# Patient Record
Sex: FEMALE | Race: WHITE | NOT HISPANIC OR LATINO
[De-identification: names, ages, dates, MRNs, and addresses within clinical notes are randomized per-mention and may not be internally consistent; named-entity substitution may affect disease eponyms.]

---

## 2022-01-31 ENCOUNTER — APPOINTMENT (OUTPATIENT)
Dept: ORTHOPEDIC SURGERY | Facility: CLINIC | Age: 72
End: 2022-01-31

## 2022-02-04 ENCOUNTER — INPATIENT (INPATIENT)
Facility: HOSPITAL | Age: 72
LOS: 0 days | Discharge: HOME | DRG: 534 | End: 2022-02-05
Attending: INTERNAL MEDICINE | Admitting: ORTHOPAEDIC SURGERY
Payer: COMMERCIAL

## 2022-02-04 ENCOUNTER — APPOINTMENT (OUTPATIENT)
Dept: ORTHOPEDIC SURGERY | Facility: CLINIC | Age: 72
End: 2022-02-04

## 2022-02-04 VITALS
OXYGEN SATURATION: 97 % | HEART RATE: 77 BPM | SYSTOLIC BLOOD PRESSURE: 116 MMHG | RESPIRATION RATE: 18 BRPM | TEMPERATURE: 98 F | WEIGHT: 139.99 LBS | HEIGHT: 62 IN | DIASTOLIC BLOOD PRESSURE: 80 MMHG

## 2022-02-04 DIAGNOSIS — F41.9 ANXIETY DISORDER, UNSPECIFIED: ICD-10-CM

## 2022-02-04 DIAGNOSIS — S72.92XA UNSPECIFIED FRACTURE OF LEFT FEMUR, INITIAL ENCOUNTER FOR CLOSED FRACTURE: ICD-10-CM

## 2022-02-04 DIAGNOSIS — Z98.84 BARIATRIC SURGERY STATUS: Chronic | ICD-10-CM

## 2022-02-04 DIAGNOSIS — R09.89 OTHER SPECIFIED SYMPTOMS AND SIGNS INVOLVING THE CIRCULATORY AND RESPIRATORY SYSTEMS: ICD-10-CM

## 2022-02-04 DIAGNOSIS — Z98.890 OTHER SPECIFIED POSTPROCEDURAL STATES: Chronic | ICD-10-CM

## 2022-02-04 DIAGNOSIS — K21.9 GASTRO-ESOPHAGEAL REFLUX DISEASE WITHOUT ESOPHAGITIS: ICD-10-CM

## 2022-02-04 DIAGNOSIS — Z29.9 ENCOUNTER FOR PROPHYLACTIC MEASURES, UNSPECIFIED: ICD-10-CM

## 2022-02-04 DIAGNOSIS — S72.90XA UNSPECIFIED FRACTURE OF UNSPECIFIED FEMUR, INITIAL ENCOUNTER FOR CLOSED FRACTURE: ICD-10-CM

## 2022-02-04 LAB
ALBUMIN SERPL ELPH-MCNC: 3.9 G/DL — SIGNIFICANT CHANGE UP (ref 3.3–5)
ALP SERPL-CCNC: 71 U/L — SIGNIFICANT CHANGE UP (ref 40–120)
ALT FLD-CCNC: 9 U/L — LOW (ref 10–45)
ANION GAP SERPL CALC-SCNC: 15 MMOL/L — SIGNIFICANT CHANGE UP (ref 5–17)
APTT BLD: 25.6 SEC — LOW (ref 27.5–35.5)
AST SERPL-CCNC: 13 U/L — SIGNIFICANT CHANGE UP (ref 10–40)
BASOPHILS # BLD AUTO: 0.02 K/UL — SIGNIFICANT CHANGE UP (ref 0–0.2)
BASOPHILS NFR BLD AUTO: 0.3 % — SIGNIFICANT CHANGE UP (ref 0–2)
BILIRUB SERPL-MCNC: 0.6 MG/DL — SIGNIFICANT CHANGE UP (ref 0.2–1.2)
BLD GP AB SCN SERPL QL: NEGATIVE — SIGNIFICANT CHANGE UP
BUN SERPL-MCNC: 18 MG/DL — SIGNIFICANT CHANGE UP (ref 7–23)
CALCIUM SERPL-MCNC: 9.5 MG/DL — SIGNIFICANT CHANGE UP (ref 8.4–10.5)
CHLORIDE SERPL-SCNC: 99 MMOL/L — SIGNIFICANT CHANGE UP (ref 96–108)
CO2 SERPL-SCNC: 23 MMOL/L — SIGNIFICANT CHANGE UP (ref 22–31)
CREAT SERPL-MCNC: 0.58 MG/DL — SIGNIFICANT CHANGE UP (ref 0.5–1.3)
EOSINOPHIL # BLD AUTO: 0.12 K/UL — SIGNIFICANT CHANGE UP (ref 0–0.5)
EOSINOPHIL NFR BLD AUTO: 1.8 % — SIGNIFICANT CHANGE UP (ref 0–6)
GLUCOSE SERPL-MCNC: 96 MG/DL — SIGNIFICANT CHANGE UP (ref 70–99)
HCT VFR BLD CALC: 33.4 % — LOW (ref 34.5–45)
HGB BLD-MCNC: 10.1 G/DL — LOW (ref 11.5–15.5)
IMM GRANULOCYTES NFR BLD AUTO: 0.3 % — SIGNIFICANT CHANGE UP (ref 0–1.5)
INR BLD: 0.97 RATIO — SIGNIFICANT CHANGE UP (ref 0.88–1.16)
LYMPHOCYTES # BLD AUTO: 2.13 K/UL — SIGNIFICANT CHANGE UP (ref 1–3.3)
LYMPHOCYTES # BLD AUTO: 32.5 % — SIGNIFICANT CHANGE UP (ref 13–44)
MCHC RBC-ENTMCNC: 25.3 PG — LOW (ref 27–34)
MCHC RBC-ENTMCNC: 30.2 GM/DL — LOW (ref 32–36)
MCV RBC AUTO: 83.5 FL — SIGNIFICANT CHANGE UP (ref 80–100)
MONOCYTES # BLD AUTO: 0.57 K/UL — SIGNIFICANT CHANGE UP (ref 0–0.9)
MONOCYTES NFR BLD AUTO: 8.7 % — SIGNIFICANT CHANGE UP (ref 2–14)
NEUTROPHILS # BLD AUTO: 3.69 K/UL — SIGNIFICANT CHANGE UP (ref 1.8–7.4)
NEUTROPHILS NFR BLD AUTO: 56.4 % — SIGNIFICANT CHANGE UP (ref 43–77)
NRBC # BLD: 0 /100 WBCS — SIGNIFICANT CHANGE UP (ref 0–0)
PLATELET # BLD AUTO: 200 K/UL — SIGNIFICANT CHANGE UP (ref 150–400)
POTASSIUM SERPL-MCNC: 4.3 MMOL/L — SIGNIFICANT CHANGE UP (ref 3.5–5.3)
POTASSIUM SERPL-SCNC: 4.3 MMOL/L — SIGNIFICANT CHANGE UP (ref 3.5–5.3)
PROT SERPL-MCNC: 7.2 G/DL — SIGNIFICANT CHANGE UP (ref 6–8.3)
PROTHROM AB SERPL-ACNC: 11.7 SEC — SIGNIFICANT CHANGE UP (ref 10.6–13.6)
RBC # BLD: 4 M/UL — SIGNIFICANT CHANGE UP (ref 3.8–5.2)
RBC # FLD: 15.3 % — HIGH (ref 10.3–14.5)
RH IG SCN BLD-IMP: POSITIVE — SIGNIFICANT CHANGE UP
SARS-COV-2 RNA SPEC QL NAA+PROBE: SIGNIFICANT CHANGE UP
SODIUM SERPL-SCNC: 137 MMOL/L — SIGNIFICANT CHANGE UP (ref 135–145)
WBC # BLD: 6.55 K/UL — SIGNIFICANT CHANGE UP (ref 3.8–10.5)
WBC # FLD AUTO: 6.55 K/UL — SIGNIFICANT CHANGE UP (ref 3.8–10.5)

## 2022-02-04 PROCEDURE — 73610 X-RAY EXAM OF ANKLE: CPT | Mod: 26,RT

## 2022-02-04 PROCEDURE — 73552 X-RAY EXAM OF FEMUR 2/>: CPT | Mod: 26,LT

## 2022-02-04 PROCEDURE — 73700 CT LOWER EXTREMITY W/O DYE: CPT | Mod: 26,LT,MA

## 2022-02-04 PROCEDURE — 99223 1ST HOSP IP/OBS HIGH 75: CPT

## 2022-02-04 PROCEDURE — 99285 EMERGENCY DEPT VISIT HI MDM: CPT | Mod: GC

## 2022-02-04 PROCEDURE — 73564 X-RAY EXAM KNEE 4 OR MORE: CPT | Mod: 26,50

## 2022-02-04 PROCEDURE — 73590 X-RAY EXAM OF LOWER LEG: CPT | Mod: 26,LT

## 2022-02-04 PROCEDURE — 76377 3D RENDER W/INTRP POSTPROCES: CPT | Mod: 26

## 2022-02-04 PROCEDURE — 93010 ELECTROCARDIOGRAM REPORT: CPT

## 2022-02-04 PROCEDURE — 71045 X-RAY EXAM CHEST 1 VIEW: CPT | Mod: 26

## 2022-02-04 RX ORDER — ACETAMINOPHEN 500 MG
975 TABLET ORAL ONCE
Refills: 0 | Status: COMPLETED | OUTPATIENT
Start: 2022-02-04 | End: 2022-02-04

## 2022-02-04 RX ORDER — ESCITALOPRAM OXALATE 10 MG/1
20 TABLET, FILM COATED ORAL DAILY
Refills: 0 | Status: DISCONTINUED | OUTPATIENT
Start: 2022-02-04 | End: 2022-02-05

## 2022-02-04 RX ORDER — ZOLPIDEM TARTRATE 10 MG/1
1 TABLET ORAL
Qty: 0 | Refills: 0 | DISCHARGE

## 2022-02-04 RX ORDER — ACETAMINOPHEN 500 MG
650 TABLET ORAL EVERY 6 HOURS
Refills: 0 | Status: DISCONTINUED | OUTPATIENT
Start: 2022-02-04 | End: 2022-02-05

## 2022-02-04 RX ORDER — LANOLIN ALCOHOL/MO/W.PET/CERES
3 CREAM (GRAM) TOPICAL AT BEDTIME
Refills: 0 | Status: DISCONTINUED | OUTPATIENT
Start: 2022-02-04 | End: 2022-02-05

## 2022-02-04 RX ORDER — IBUPROFEN 200 MG
400 TABLET ORAL ONCE
Refills: 0 | Status: COMPLETED | OUTPATIENT
Start: 2022-02-04 | End: 2022-02-04

## 2022-02-04 RX ORDER — LIDOCAINE 4 G/100G
1 CREAM TOPICAL DAILY
Refills: 0 | Status: DISCONTINUED | OUTPATIENT
Start: 2022-02-04 | End: 2022-02-05

## 2022-02-04 RX ORDER — OXYCODONE HYDROCHLORIDE 5 MG/1
5 TABLET ORAL EVERY 4 HOURS
Refills: 0 | Status: DISCONTINUED | OUTPATIENT
Start: 2022-02-04 | End: 2022-02-05

## 2022-02-04 RX ORDER — ONDANSETRON 8 MG/1
4 TABLET, FILM COATED ORAL EVERY 8 HOURS
Refills: 0 | Status: DISCONTINUED | OUTPATIENT
Start: 2022-02-04 | End: 2022-02-05

## 2022-02-04 RX ORDER — ZOLPIDEM TARTRATE 10 MG/1
5 TABLET ORAL AT BEDTIME
Refills: 0 | Status: DISCONTINUED | OUTPATIENT
Start: 2022-02-04 | End: 2022-02-05

## 2022-02-04 RX ORDER — LANSOPRAZOLE 15 MG/1
1 CAPSULE, DELAYED RELEASE ORAL
Qty: 0 | Refills: 0 | DISCHARGE

## 2022-02-04 RX ORDER — ASPIRIN/CALCIUM CARB/MAGNESIUM 324 MG
81 TABLET ORAL
Refills: 0 | Status: DISCONTINUED | OUTPATIENT
Start: 2022-02-04 | End: 2022-02-05

## 2022-02-04 RX ORDER — PANTOPRAZOLE SODIUM 20 MG/1
40 TABLET, DELAYED RELEASE ORAL
Refills: 0 | Status: DISCONTINUED | OUTPATIENT
Start: 2022-02-04 | End: 2022-02-05

## 2022-02-04 RX ORDER — POLYETHYLENE GLYCOL 3350 17 G/17G
17 POWDER, FOR SOLUTION ORAL DAILY
Refills: 0 | Status: DISCONTINUED | OUTPATIENT
Start: 2022-02-04 | End: 2022-02-05

## 2022-02-04 RX ORDER — LIDOCAINE 4 G/100G
1 CREAM TOPICAL ONCE
Refills: 0 | Status: COMPLETED | OUTPATIENT
Start: 2022-02-04 | End: 2022-02-04

## 2022-02-04 RX ADMIN — LIDOCAINE 1 PATCH: 4 CREAM TOPICAL at 13:01

## 2022-02-04 RX ADMIN — OXYCODONE HYDROCHLORIDE 5 MILLIGRAM(S): 5 TABLET ORAL at 23:20

## 2022-02-04 RX ADMIN — LIDOCAINE 1 PATCH: 4 CREAM TOPICAL at 22:00

## 2022-02-04 RX ADMIN — Medication 975 MILLIGRAM(S): at 13:00

## 2022-02-04 NOTE — ED PROVIDER NOTE - NS ED ROS FT
CONST: no fevers, no chills  ENT: no sore throat  CV: +chest pain, no leg swelling  RESP: no shortness of breath, no cough  ABD: no abdominal pain, no nausea, no vomiting, no diarrhea  : no dysuria, no flank pain, no hematuria  MSK: no back pain, +extremity pain  NEURO: no headache or additional neurologic complaints  SKIN:  no rash

## 2022-02-04 NOTE — H&P ADULT - PROBLEM SELECTOR PLAN 2
ISTOP reviewed Reference #: [id number]  -Cont. ambien 5mg QHS PRN insomnia  -Falls precautions  -Cont. lexapro daily

## 2022-02-04 NOTE — H&P ADULT - HISTORY OF PRESENT ILLNESS
72F w/ hx of gastric bypass p/w fall from bed 5 days ago. Pt had mechanical fall out of bed 5 days ago. Had severe L leg pain after fall and was unable to bear weight. Has been in bed for past several days due to pain. Has been taking 's oxycodone to tolerate pain with movement. No significant pain when not moving. Pt called PMD today who suggested she go to hospital for further evaluation. Pt otherwise denies any dizziness, chest pain, palpitations or syncopal episodes.    In ER: Given tylenol 975mg PO, ibuprofen 400mg, lidocaine patch

## 2022-02-04 NOTE — ED PROVIDER NOTE - OBJECTIVE STATEMENT
72F no significant PMH presenting after mechanical fall from bed 5 days ago. No head trauma, LOC, A/C use. Complaining of R chest pain and L knee pain since then. Unable to bear weight or walk. Been taking oxycodone and tylenol at home, last use this morning 7AM. No fevers/chils, vision changes, n/v, abdominal pain, SOB, numbness/tingling

## 2022-02-04 NOTE — ED PROVIDER NOTE - PROGRESS NOTE DETAILS
Atilio REYES (PGY-2)  per ortho team, pt will not be operated on but will be admitted to dr. redd's service The patient wishes to be discharged against medical advice. I have assessed the patient's mental status and  the patient has capacity to make this decision. I have explained the risks of leaving without full treatment, including fall causing severe disability, which the patient understands and is willing to accept. I have answered all of the patient's questions. I reiterated my medical opinion and advised the patient to return at any time. We discussed the further workup outside of the current visit and return precautions. Patient states she has walkers and wheelchair and will get about her home that way. Ortho will see her Wednesday to make brace. Needs to be nwb/toe touch. -Ruperto Bui MD- now agrees to stay as cannot use crutches - discussed the case with the admitting MD

## 2022-02-04 NOTE — ED PROVIDER NOTE - CLINICAL SUMMARY MEDICAL DECISION MAKING FREE TEXT BOX
72F p/w fall after 5 days with chest pain and L LE pain. VSS in ED. ECG w/o abnormal changes. NCAT head, lungs ctab, spine Nontender, abd nontender, B/L LE tenderness, R cheset wall tenderness  Will eval for LE injury with xrays and chest wall injury on CXR. Low concern for myocardial contusion given no active chest pain and no ECG changes

## 2022-02-04 NOTE — CONSULT NOTE ADULT - SUBJECTIVE AND OBJECTIVE BOX
Patient is a 72yFemale community ambulator who presents to ED w/ a c/o of left knee pain for the past 5 days. Patient states she fell out of bed 5 days ago landing on her left side, she felt immediate pain in the left knee and has had pain with weight bearing on the left knee since. Denies HH/LOC. Denies any numbness or tingling. Denies having any other pain elsewhere. No other orthopedic concerns at this time.    No pertinent past medical history            adhesives (Urticaria)  No Known Drug Allergies      PHYSICAL EXAM:  T(C): 36.6 (02-04-22 @ 15:59), Max: 36.7 (02-04-22 @ 11:26)  HR: 74 (02-04-22 @ 15:59) (74 - 77)  BP: 114/77 (02-04-22 @ 15:59) (114/77 - 133/82)  RR: 18 (02-04-22 @ 15:59) (18 - 18)  SpO2: 97% (02-04-22 @ 15:59) (97% - 99%)    Gen: NAD, Resting comfortably    LLE:  Skin intact, no erythema or ecchymosis  pos bony tenderness to palpation over lateral left knee  pain with ROM of left knee  +EHL/FHL/TA/GSC  +SILT L3-S1  + DP  Compartments soft and compressible  No calf tenderness    Secondary Exam: Benign, Skin intact, NTTP along axial spine, SILT throughout, motor grossly intact throughout, no other orthopedic injuries at this time, compartments soft and compressible    XR and CT show nondisplaced distal femoral lateral condyle fx.    A/P: 72F w/ left nondisplaced distal femoral lateral condyle fx    placed in knee immobilizer to be NWB on LLE  Analgesia  DVT ppx rec xarelto 10mg once per day or A81 twice per day  PT/OT  Ice and elevate as tolerated  No acute orthopedic surgical intervention indicated at this time  Orthopedically stable  Discussed with attending who is in agreement with above plan  plan to FU w Dr Burden in office next Wednesday will transition to a samson brace at that time  pt aware and agrees w plan, all questions answered

## 2022-02-04 NOTE — H&P ADULT - NSHPPHYSICALEXAM_GEN_ALL_CORE
Vital Signs Last 24 Hrs  T(C): 37 (02-04-22 @ 21:28), Max: 37 (02-04-22 @ 21:28)  T(F): 98.6 (02-04-22 @ 21:28), Max: 98.6 (02-04-22 @ 21:28)  HR: 76 (02-04-22 @ 21:28) (74 - 77)  BP: 119/77 (02-04-22 @ 21:28) (114/77 - 133/82)  BP(mean): --  RR: 18 (02-04-22 @ 21:28) (18 - 18)  SpO2: 98% (02-04-22 @ 21:28) (97% - 99%)

## 2022-02-04 NOTE — ED ADULT NURSE REASSESSMENT NOTE - NS ED NURSE REASSESS COMMENT FT1
Pt sitting up in stretcher on phone, no acute distress. Pt updated on plan of care, awaiting CT. No further RN interventions at this time.

## 2022-02-04 NOTE — ED ADULT NURSE NOTE - OBJECTIVE STATEMENT
Pt is 71 y/o female who presents to the ED by EMS c/o fall. Pt states fell x5 days and has L knee pain and R chest pain. Pt PMH of GERD and osteoporosis, compliant w/ daily medications and denies AC use. Upon assessment, pt axo x3, sitting up in bed, and speaking in full sentences. Pt is breathing spontaneously and unlabored, abdomen is soft and non-tender to palpation. Pt endorses L knee pain w/ ROM and R upper chest wall pain on palpation. Skin is warm, dry, and intact w/ + peripheral pulses. Pt denies LOC, dizziness, and n/v/d. Pt is 71 y/o female who presents to the ED by EMS c/o fall. Pt states fell x5 days and has L knee pain and R chest pain. Pt PMH of GERD and osteoporosis, compliant w/ daily medications and denies AC use. Upon assessment, pt axo x3, sitting up in bed, and speaking in full sentences. Pt is breathing spontaneously and unlabored, abdomen is soft and non-tender to palpation. Pt endorses L knee pain w/ ROM, difficultly ambulating, and R upper chest wall pain on palpation. Skin is warm, dry, and intact w/ + peripheral pulses. Pt denies LOC, dizziness, and n/v/d. Safety and comfort measures provided- bed in lowest position, locked, blanket given.

## 2022-02-04 NOTE — ED PROVIDER NOTE - ATTENDING CONTRIBUTION TO CARE
I, Yang Gould, performed a history and physical exam of the patient and discussed their management with the resident and /or advanced care provider. I reviewed the resident and /or ACP's note and agree with the documented findings and plan of care. I was present and available for all procedures.  Patient with fall 5 days ago without head trauma. LE xrays wnl and also evaluated for possible rib fracture that has low pretest probablility. Patient will likely require help with ambulation pending xrays.

## 2022-02-04 NOTE — ED PROVIDER NOTE - PHYSICAL EXAMINATION
Physical Exam:  Gen: awake alert   Head: NCAT  HEENT: EOMI, PEERL, normal conjunctiva, oral mucosa moist  Lung: CTAB, no respiratory distress, no wheezes/rhonchi/rales B/L, speaking in full sentences  CV: TTP R chest wall, RRR  Abd: soft, NT, ND, no guarding, no rigidity, no rebound tenderness, no CVA tenderness   MSK: no visible deformities, TTP of L knee/proximal tib-fib, TTP or R ankle and knee, no spinal tenderness   Neuro: No focal sensory or motor deficits, 2+ DP/radial pulses b/l  Skin: Warm, well perfused, no rash, no leg swelling  ~Rubén Trimble MD (PGY-2)

## 2022-02-04 NOTE — H&P ADULT - NSHPADDITIONALINFOADULT_GEN_ALL_CORE
I was asked to see his patient by the hospitalist in charge. Dr. Argueta to assume care for patient in AM and thereafter

## 2022-02-04 NOTE — H&P ADULT - ASSESSMENT
72F w/ hx of gastric bypass p/w fall from bed 5 days ago found to have on-displaced L femoral fracture

## 2022-02-04 NOTE — H&P ADULT - PROBLEM SELECTOR PLAN 1
Appreciate orthopedic recommendations. No plans for surgery at this point. Likely mechanical fall. Pt and  wants patient discharged home.  -Tylenol PRN mild pain, lidocaine patch  -Oxycodone PRN severe pain  -Cannot tolerate NSAIDs due to gastric bypass  -NWB in LLE  -cont. asa BID for DVT PPx for now, pt prefers to xarelto at this point  -PT consult  -Cont. brace

## 2022-02-05 ENCOUNTER — TRANSCRIPTION ENCOUNTER (OUTPATIENT)
Age: 72
End: 2022-02-05

## 2022-02-05 VITALS
HEART RATE: 80 BPM | SYSTOLIC BLOOD PRESSURE: 107 MMHG | RESPIRATION RATE: 18 BRPM | OXYGEN SATURATION: 97 % | TEMPERATURE: 98 F | DIASTOLIC BLOOD PRESSURE: 73 MMHG

## 2022-02-05 LAB
ALBUMIN SERPL ELPH-MCNC: 3.7 G/DL — SIGNIFICANT CHANGE UP (ref 3.3–5)
ALP SERPL-CCNC: 65 U/L — SIGNIFICANT CHANGE UP (ref 40–120)
ALT FLD-CCNC: 8 U/L — LOW (ref 10–45)
ANION GAP SERPL CALC-SCNC: 17 MMOL/L — SIGNIFICANT CHANGE UP (ref 5–17)
AST SERPL-CCNC: 11 U/L — SIGNIFICANT CHANGE UP (ref 10–40)
BILIRUB SERPL-MCNC: 0.4 MG/DL — SIGNIFICANT CHANGE UP (ref 0.2–1.2)
BUN SERPL-MCNC: 18 MG/DL — SIGNIFICANT CHANGE UP (ref 7–23)
CALCIUM SERPL-MCNC: 9.3 MG/DL — SIGNIFICANT CHANGE UP (ref 8.4–10.5)
CHLORIDE SERPL-SCNC: 100 MMOL/L — SIGNIFICANT CHANGE UP (ref 96–108)
CO2 SERPL-SCNC: 21 MMOL/L — LOW (ref 22–31)
CREAT SERPL-MCNC: 0.53 MG/DL — SIGNIFICANT CHANGE UP (ref 0.5–1.3)
GLUCOSE SERPL-MCNC: 89 MG/DL — SIGNIFICANT CHANGE UP (ref 70–99)
HCT VFR BLD CALC: 31.8 % — LOW (ref 34.5–45)
HCV AB S/CO SERPL IA: 0.1 S/CO — SIGNIFICANT CHANGE UP (ref 0–0.99)
HCV AB SERPL-IMP: SIGNIFICANT CHANGE UP
HGB BLD-MCNC: 9.9 G/DL — LOW (ref 11.5–15.5)
MAGNESIUM SERPL-MCNC: 1.6 MG/DL — SIGNIFICANT CHANGE UP (ref 1.6–2.6)
MCHC RBC-ENTMCNC: 25.4 PG — LOW (ref 27–34)
MCHC RBC-ENTMCNC: 31.1 GM/DL — LOW (ref 32–36)
MCV RBC AUTO: 81.5 FL — SIGNIFICANT CHANGE UP (ref 80–100)
NRBC # BLD: 0 /100 WBCS — SIGNIFICANT CHANGE UP (ref 0–0)
PLATELET # BLD AUTO: 197 K/UL — SIGNIFICANT CHANGE UP (ref 150–400)
POTASSIUM SERPL-MCNC: 3.9 MMOL/L — SIGNIFICANT CHANGE UP (ref 3.5–5.3)
POTASSIUM SERPL-SCNC: 3.9 MMOL/L — SIGNIFICANT CHANGE UP (ref 3.5–5.3)
PROT SERPL-MCNC: 6.7 G/DL — SIGNIFICANT CHANGE UP (ref 6–8.3)
RBC # BLD: 3.9 M/UL — SIGNIFICANT CHANGE UP (ref 3.8–5.2)
RBC # FLD: 15.4 % — HIGH (ref 10.3–14.5)
SODIUM SERPL-SCNC: 138 MMOL/L — SIGNIFICANT CHANGE UP (ref 135–145)
WBC # BLD: 7.51 K/UL — SIGNIFICANT CHANGE UP (ref 3.8–10.5)
WBC # FLD AUTO: 7.51 K/UL — SIGNIFICANT CHANGE UP (ref 3.8–10.5)

## 2022-02-05 PROCEDURE — 97161 PT EVAL LOW COMPLEX 20 MIN: CPT

## 2022-02-05 PROCEDURE — 85610 PROTHROMBIN TIME: CPT

## 2022-02-05 PROCEDURE — 76377 3D RENDER W/INTRP POSTPROCES: CPT

## 2022-02-05 PROCEDURE — U0003: CPT

## 2022-02-05 PROCEDURE — 73590 X-RAY EXAM OF LOWER LEG: CPT

## 2022-02-05 PROCEDURE — 83735 ASSAY OF MAGNESIUM: CPT

## 2022-02-05 PROCEDURE — 86900 BLOOD TYPING SEROLOGIC ABO: CPT

## 2022-02-05 PROCEDURE — 36415 COLL VENOUS BLD VENIPUNCTURE: CPT

## 2022-02-05 PROCEDURE — 86803 HEPATITIS C AB TEST: CPT

## 2022-02-05 PROCEDURE — 86901 BLOOD TYPING SEROLOGIC RH(D): CPT

## 2022-02-05 PROCEDURE — 99285 EMERGENCY DEPT VISIT HI MDM: CPT | Mod: 25

## 2022-02-05 PROCEDURE — 73564 X-RAY EXAM KNEE 4 OR MORE: CPT

## 2022-02-05 PROCEDURE — 85730 THROMBOPLASTIN TIME PARTIAL: CPT

## 2022-02-05 PROCEDURE — 80053 COMPREHEN METABOLIC PANEL: CPT

## 2022-02-05 PROCEDURE — 85025 COMPLETE CBC W/AUTO DIFF WBC: CPT

## 2022-02-05 PROCEDURE — 73610 X-RAY EXAM OF ANKLE: CPT

## 2022-02-05 PROCEDURE — 73700 CT LOWER EXTREMITY W/O DYE: CPT | Mod: MA

## 2022-02-05 PROCEDURE — 71045 X-RAY EXAM CHEST 1 VIEW: CPT

## 2022-02-05 PROCEDURE — 93005 ELECTROCARDIOGRAM TRACING: CPT

## 2022-02-05 PROCEDURE — 86850 RBC ANTIBODY SCREEN: CPT

## 2022-02-05 PROCEDURE — 73552 X-RAY EXAM OF FEMUR 2/>: CPT

## 2022-02-05 RX ORDER — CALCIUM CARBONATE 500(1250)
2 TABLET ORAL EVERY 4 HOURS
Refills: 0 | Status: DISCONTINUED | OUTPATIENT
Start: 2022-02-05 | End: 2022-02-05

## 2022-02-05 RX ORDER — LIDOCAINE 4 G/100G
1 CREAM TOPICAL
Qty: 0 | Refills: 0 | DISCHARGE
Start: 2022-02-05

## 2022-02-05 RX ORDER — ACETAMINOPHEN 500 MG
2 TABLET ORAL
Qty: 0 | Refills: 0 | DISCHARGE
Start: 2022-02-05

## 2022-02-05 RX ORDER — ESCITALOPRAM OXALATE 10 MG/1
1 TABLET, FILM COATED ORAL
Qty: 0 | Refills: 0 | DISCHARGE

## 2022-02-05 RX ORDER — LANSOPRAZOLE 15 MG/1
15 CAPSULE, DELAYED RELEASE ORAL DAILY
Refills: 0 | Status: DISCONTINUED | OUTPATIENT
Start: 2022-02-05 | End: 2022-02-05

## 2022-02-05 RX ORDER — ASPIRIN/CALCIUM CARB/MAGNESIUM 324 MG
1 TABLET ORAL
Qty: 60 | Refills: 0
Start: 2022-02-05 | End: 2022-03-06

## 2022-02-05 RX ORDER — OXYCODONE HYDROCHLORIDE 5 MG/1
1 TABLET ORAL
Qty: 20 | Refills: 0
Start: 2022-02-05 | End: 2022-02-09

## 2022-02-05 RX ORDER — CALCIUM CARBONATE 500(1250)
2 TABLET ORAL
Qty: 0 | Refills: 0 | DISCHARGE
Start: 2022-02-05

## 2022-02-05 RX ADMIN — Medication 650 MILLIGRAM(S): at 05:23

## 2022-02-05 RX ADMIN — Medication 650 MILLIGRAM(S): at 06:00

## 2022-02-05 RX ADMIN — OXYCODONE HYDROCHLORIDE 5 MILLIGRAM(S): 5 TABLET ORAL at 00:15

## 2022-02-05 RX ADMIN — LIDOCAINE 1 PATCH: 4 CREAM TOPICAL at 12:02

## 2022-02-05 RX ADMIN — ESCITALOPRAM OXALATE 20 MILLIGRAM(S): 10 TABLET, FILM COATED ORAL at 03:43

## 2022-02-05 RX ADMIN — ZOLPIDEM TARTRATE 5 MILLIGRAM(S): 10 TABLET ORAL at 00:15

## 2022-02-05 RX ADMIN — Medication 81 MILLIGRAM(S): at 05:23

## 2022-02-05 RX ADMIN — OXYCODONE HYDROCHLORIDE 5 MILLIGRAM(S): 5 TABLET ORAL at 04:26

## 2022-02-05 RX ADMIN — Medication 81 MILLIGRAM(S): at 17:26

## 2022-02-05 RX ADMIN — OXYCODONE HYDROCHLORIDE 5 MILLIGRAM(S): 5 TABLET ORAL at 05:15

## 2022-02-05 RX ADMIN — LIDOCAINE 1 PATCH: 4 CREAM TOPICAL at 03:28

## 2022-02-05 RX ADMIN — LANSOPRAZOLE 15 MILLIGRAM(S): 15 CAPSULE, DELAYED RELEASE ORAL at 05:26

## 2022-02-05 NOTE — DISCHARGE NOTE PROVIDER - NSDCFUADDINST_GEN_ALL_CORE_FT
SANDRO w Dr Burden in office next Wednesday & will transition to a samson brace at that time / pt agrees w plan  Discharge as pt. is stating she wants to go home with home PT, despite PT recs for TREVOR

## 2022-02-05 NOTE — PHYSICAL THERAPY INITIAL EVALUATION ADULT - PERTINENT HX OF CURRENT PROBLEM, REHAB EVAL
71 y/o F PMH: gastric bypass p/w fall from bed 5 days ago, + severe LLE pain, unable to bear weight. Sedentary past few days 2/2 pain. Has been taking 's oxycodone to tolerate pain with movement, pain-free at rest. Pt found to have non-displaced L femoral fracture 2/2 mechanical fall

## 2022-02-05 NOTE — DISCHARGE NOTE PROVIDER - NSDCCPCAREPLAN_GEN_ALL_CORE_FT
PRINCIPAL DISCHARGE DIAGNOSIS  Diagnosis: Femur fracture, left  Assessment and Plan of Treatment: Femoral fracture; seen and followed by orthopedics & no plan for surgery at this point. Likely mechanical fall and pt. OK with discharge home.  -Tylenol PRN mild pain, lidocaine patch / Oxycodone PRN severe pain  -Cannot tolerate NSAIDs due to gastric bypass  -NWB in LLE and seen by PT and will c/w brace    -cont. asa BID for DVT PPx (pt prefers this to xarelto)       PRINCIPAL DISCHARGE DIAGNOSIS  Diagnosis: Femur fracture, left  Assessment and Plan of Treatment: Femoral fracture; seen and followed by orthopedics & no plan for surgery at this point. Likely mechanical fall and pt. OK with discharge home.  -Tylenol PRN mild pain, lidocaine patch / Oxycodone PRN severe pain  -Cannot tolerate NSAIDs due to gastric bypass  -NWB in LLE and seen by PT and will c/w brace    -cont. asa BID for DVT PPx (pt prefers this to xarelto)  FU w Dr Burden in office next Wednesday & will transition to a samson brace at that time / pt agrees w plan  Discharge as pt. is stating she wants to go home with home PT. Pt. is aware that PT advised TREVOR but is insisting that she has to go home.          SECONDARY DISCHARGE DIAGNOSES  Diagnosis: Anxiety  Assessment and Plan of Treatment: Anxiety; ISTOP reviewed Reference #: [id number] and c/w ambien 5mg QHS PRN insomnia and lexapro daily   -+Falls precautions    Diagnosis: GERD (gastroesophageal reflux disease)  Assessment and Plan of Treatment: GERD (gastroesophageal reflux disease); cont. with PPI.

## 2022-02-05 NOTE — PHYSICAL THERAPY INITIAL EVALUATION ADULT - ADDITIONAL COMMENTS
Pt lives with spouse in a PH +1STE, all needs met on main level. Pt reports  can help, but has his own medical issues. Pt was amb (I) without an AD and (I) with all ADLs PTA. Pt owns R/W, W/C, shower chair, and commode from husbands prior surgeries.

## 2022-02-05 NOTE — DISCHARGE NOTE PROVIDER - HOSPITAL COURSE
72F w/ hx of gastric bypass p/w fall from bed 5 days ago found to have on-displaced L femoral fracture    Femoral fracture; seen and followed by orthopedics & no plan for surgery at this point. Likely mechanical fall and pt. OK with discharge home.  -Tylenol PRN mild pain, lidocaine patch / Oxycodone PRN severe pain  -Cannot tolerate NSAIDs due to gastric bypass  -NWB in LLE and seen by PT and will c/w brace    -cont. asa BID for DVT PPx (pt prefers this to xarelto)     Anxiet; ISTOP reviewed Reference #: [id number] and c/w ambien 5mg QHS PRN insomnia and lexapro daily   -+Falls precautions    GERD (gastroesophageal reflux disease); cont. with PPI.    DVT prophylaxis; Asa BID for now as above     On 2/5  - seen by PT and will FU w Dr Burden in office next Wednesday & will transition to a samson brace at that time / pt agrees w plan     72F w/ hx of gastric bypass p/w fall from bed 5 days ago found to have on-displaced L femoral fracture    Femoral fracture; seen and followed by orthopedics & no plan for surgery at this point. Likely mechanical fall and pt. OK with discharge home.  -Tylenol PRN mild pain, lidocaine patch / Oxycodone PRN severe pain  -Cannot tolerate NSAIDs due to gastric bypass  -NWB in LLE and seen by PT and will c/w brace    -cont. asa BID for DVT PPx (pt prefers this to xarelto)     Anxiety; ISTOP reviewed Reference #: [id number] and c/w ambien 5mg QHS PRN insomnia and lexapro daily   -+Falls precautions    GERD (gastroesophageal reflux disease); cont. with PPI.    DVT prophylaxis; Asa BID for now as above     On 2/5  - seen by PT and will FU w Dr Burden in office next Wednesday & will transition to a samson brace at that time / pt agrees w plan  Discharge as pt. is stating she wants to go home with home PT. Pt. is aware that        72F w/ hx of gastric bypass p/w fall from bed 5 days ago found to have on-displaced L femoral fracture    Femoral fracture; seen and followed by orthopedics & no plan for surgery at this point. Likely mechanical fall and pt. with recs for TREVOR but wants to go home  Per ortho Non-displaced L femoral lateral condyle fx. and placed in knee immobilizer to be NWB on LLE  -Tylenol PRN mild pain, lidocaine patch / Oxycodone PRN severe pain  -Cannot tolerate NSAIDs due to gastric bypass  -NWB in LLE as above and seen by PT and will c/w brace / home PT per pt.    -cont. asa BID for DVT PPx (pt prefers this to xarelto)     Anxiety; ISTOP reviewed Reference #: [id number] and c/w ambien 5mg QHS PRN insomnia and lexapro daily   -+Falls precautions    GERD (gastroesophageal reflux disease); cont. with PPI.    DVT prophylaxis; Asa BID for now as above     On 2/5  - seen by PT and will FU w Dr Burden in office next Wednesday & will transition to a samson brace at that time / pt agrees w plan  Discharge as pt. is stating she wants to go home with home PT. Pt. is aware that of the risks of choosing home with home PT over TREVOR.  mckayla p[t. adds she will have help with her son and she will be hiring  additional help. Discussed with Dr. Argueta

## 2022-02-05 NOTE — DISCHARGE NOTE NURSING/CASE MANAGEMENT/SOCIAL WORK - PATIENT PORTAL LINK FT
You can access the FollowMyHealth Patient Portal offered by [facility] by registering at the following website: [email or web address]. By joining [facility]’s FollowMyHealth portal, you will also be able to view your health information using other applications (apps) compatible with our system.

## 2022-02-05 NOTE — PHYSICAL THERAPY INITIAL EVALUATION ADULT - STRENGTHENING, PT EVAL
GOAL: Pt will improve RLE strength to 4/5, for increased limb stability, to improve gait and facilitate stair negotiation in 4 weeks.

## 2022-02-05 NOTE — PHYSICAL THERAPY INITIAL EVALUATION ADULT - PRECAUTIONS/LIMITATIONS, REHAB EVAL
XRAY KNEE/TIBIA/FIBULA (2/4): No fractures or dislocations in above imaged anatomic regions. Generalized osteopenia, no discrete lytic or blastic lesions. Scant faint R ankle region vascular calcifications. XRAY CHEST (2/4): no rib fractures; clear. CT KNEE (2/4): Nondisplaced distal femoral fracture with minimal contact of the superior femoral trochlea articular surface. Small lipohemarthrosis, degenerative changes. Pt is now NWB to LLE, WBAT to RLE./no known precautions/limitations XRAY KNEE/TIBIA/FIBULA (2/4): No fractures or dislocations in above imaged anatomic regions. Generalized osteopenia, no discrete lytic or blastic lesions. Scant faint R ankle region vascular calcifications. XRAY CHEST (2/4): no rib fractures; clear. CT KNEE (2/4): Nondisplaced distal femoral fracture with minimal contact of the superior femoral trochlea articular surface. Small lipohemarthrosis, degenerative changes. Pt is now NWB to LLE, WBAT to RLE./fall precautions

## 2022-02-05 NOTE — DISCHARGE NOTE NURSING/CASE MANAGEMENT/SOCIAL WORK - NSDCFUADDAPPT_GEN_ALL_CORE_FT
APPTS ARE READY TO BE MADE: [ x] YES    Best Family or Patient Contact (if needed):    Additional Information about above appointments (if needed):    1: Dr Burden in office next Wednesday & will transition to a samson brace at that time  2:   3:     Other comments or requests:

## 2022-02-05 NOTE — DISCHARGE NOTE PROVIDER - NSDCMRMEDTOKEN_GEN_ALL_CORE_FT
Ambien 5 mg oral tablet: 1 tab(s) orally once a day (at bedtime), As Needed  escitalopram 20 mg oral tablet: 1 tab(s) orally once a day  lansoprazole 15 mg oral delayed release capsule: 1 cap(s) orally once a day  Vitamin Daily Liquid:    acetaminophen 325 mg oral tablet: 2 tab(s) orally every 6 hours, As needed, Mild Pain (1 - 3)  Ambien 5 mg oral tablet: 1 tab(s) orally once a day (at bedtime), As Needed  aspirin 81 mg oral delayed release tablet: 1 tab(s) orally 2 times a day  calcium carbonate 500 mg (200 mg elemental calcium) oral tablet, chewable: 2 tab(s) orally every 4 hours, As needed, Heartburn  lansoprazole 15 mg oral delayed release capsule: 1 cap(s) orally once a day  lidocaine 4% topical film: Apply topically to affected area once a day  oxyCODONE 5 mg oral tablet: 1 tab(s) orally every 6 hours, As Needed -Severe Pain (7 - 10)   No driving  / Hold if sleepy / No important decisions MDD:4  PT - with non-displaced L femoral lateral condyle fx. and placed in knee immobilizer to be NWB on LLE / PT 2-3 x week for 3 weeks : Dx - non-displaced Left femoral lateral condyle fx  ICD - S72.422A  Vitamin Daily Liquid:

## 2022-02-05 NOTE — DISCHARGE NOTE PROVIDER - CARE PROVIDER_API CALL
Tonja Burden)  Orthopaedic Surgery  [address] [place] [address], [address]  [place] [address]  Phone: [phone]  Fax: [phone]  Follow Up Time:     Hari Ambrosio  INFECTIOUS DISEASE  [address], [place] [id number]  Phone: [phone]  Fax: [phone]  Follow Up Time:

## 2022-02-05 NOTE — DISCHARGE NOTE NURSING/CASE MANAGEMENT/SOCIAL WORK - NSDCPEFALRISK_GEN_ALL_CORE
For information on Fall & Injury Prevention, visit: [email or web address] OR  [email or web address] OR  https://www.cdc.gov/steadi/patient.html

## 2022-02-05 NOTE — DISCHARGE NOTE PROVIDER - NSDCFUADDAPPT_GEN_ALL_CORE_FT
APPTS ARE READY TO BE MADE: [ x] YES    Best Family or Patient Contact (if needed):    Additional Information about above appointments (if needed):    1: Dr Burden in office next Wednesday & will transition to a samson brace at that time  2:   3:     Other comments or requests:    APPTS ARE READY TO BE MADE: [ x] YES    Best Family or Patient Contact (if needed):    Additional Information about above appointments (if needed):    1: Dr Mansfield in office next Wednesday & will transition to a samson brace at that time  2: Patient was previously scheduled with Dr. MANSFIELD on (2/16/22 ) (10:30AM) at (Location)  3:     Other comments or requests:    APPTS ARE READY TO BE MADE: [ x] YES    Best Family or Patient Contact (if needed):    Additional Information about above appointments (if needed):    1: Dr Mansfield in office next Wednesday & will transition to a samson brace at that time  2: Patient was previously scheduled with Dr. MANSFIELD on (2/16/22 ) (10:30AM) at (Location)  3: Patient was provided with (doctors name and information) and was advised to call to schedule follow up within specified time frame. At this time patient declined scheduling assistance.    Other comments or requests:

## 2022-02-05 NOTE — PATIENT PROFILE ADULT - FALL HARM RISK - HARM RISK INTERVENTIONS

## 2022-02-05 NOTE — PHYSICAL THERAPY INITIAL EVALUATION ADULT - MANUAL MUSCLE TESTING RESULTS, REHAB EVAL
BUE grossly assessed to 3+/5, RLE grossly assessed to 3+/5, LLE not assessed/grossly assessed due to

## 2022-02-05 NOTE — PHYSICAL THERAPY INITIAL EVALUATION ADULT - PLANNED THERAPY INTERVENTIONS, PT EVAL
STAIR GOAL: pt will ascend/descend 1 step via hopping +BUE's on unilateral handrail independently in 4 weeks/balance training/bed mobility training/gait training/strengthening/transfer training

## 2022-02-06 NOTE — CHART NOTE - NSCHARTNOTEFT_GEN_A_CORE
Medicine NP note     72F w/ hx of gastric bypass p/w fall from bed 5 days ago found to have on-displaced L femoral lateral condyle fx    2/4 - Seen by ortho and placed in knee immobilizer to be NWB on LLE  DVT ppx rec xarelto 10mg once per day or ASA 81 twice per day  PT / Ice and elevate as tolerated per ortho and no acute orthopedic surgical intervention at this time  Orthopedically stable for discharge; discussed with attending who is in agreement with above plan  Pt. will FU w Dr Burden in office next Wednesday will transition to a samson brace at that time  pt aware and agrees w plan, all questions answered.  2/5 - Pt. was sen by PT today x2 with transfer to chair and back to bed and TREVOR recommended for discharge plan.    P.t is not agreeable to TREVOR and states she wants to go home despite risks of declining TREVOR at this time.  Pt states she understands the recommendation and is still declining.  She is refusing TREVOR and states she will work with PT at home and f/u as directed.  She adds that she has her son at home to help her and will be hiring additional help in the home setting.  Discussed with Dr. Argueta and pt. dc'd home.         JANENE Nguyen, NP - BC  [phone]
EMERGENCY : SW received call from patient's son Tino Anna PH: [phone]. Chart reviewed. As per chart review patient is a "72F no significant PMH presenting after mechanical fall from bed 5 days ago. No head trauma, LOC, A/C use. Complaining of R chest pain and L knee pain since then. Unable to bear weight or walk." LMSW met with patient at bedside and introduced self and role to which she verbalized understanding. Patient is A&Ox4 at this time and consents for full communication with her son, Tino. She declines to identify a caregiver at this time.     Patient's son states that for the past week, patient has been primarily bed bound. He states that she lives with her  in a private home in [place], [place] with one step to enter the home. He states that she has not been able to ambulate independently at all in the past few days and has been requiring assistance with adl's and ambulation due to pain. He states that her  helps as much as he can but that he also does require some assistance at home as well. He is currently with him at this time. He is requesting information on private hire home health aides. He states his parents have Medicare and a long term care plan that they have also reached out to to inquire about assisted care in the home but that he would like to privately hire assistance in the mean time. RALEIGH provided him with [facility] resources for private hire HHA's to which he was receptive.     [facility] provided ED MD with above information provided and MD to order PT consult at this time to assess patient and make PT recommendations to ensure safety. Handoff provided to incoming SW colleague for further follow up. Social work continues to follow, patient pending CT and PT recs for D/C planning.
Patient requested call back on 02/08.

## 2024-06-12 NOTE — PATIENT PROFILE ADULT - LEGAL HELP
Patient notified of recommendations and is in understanding. Follow up visit scheduled for next week.    no

## 2024-07-15 NOTE — PHYSICAL THERAPY INITIAL EVALUATION ADULT - PHYSICAL ASSIST/NONPHYSICAL ASSIST: SUPINE/SIT, REHAB EVAL
Note from Breast Specialist Mayda ALATORRE:      Pain/tenderness in bilat breast areas are thought to be muscular and radiating from shoulders.  FYI.  Please advise if any recommendations beyond this.  She is scheduled for EMG RUE on 7/23/2024. Just wanted to make you aware of that, if want to add something to her treatment plan.  Thank you.      
verbal cues/nonverbal cues (demo/gestures)/1 person assist